# Patient Record
Sex: MALE | Race: WHITE | Employment: FULL TIME | URBAN - METROPOLITAN AREA
[De-identification: names, ages, dates, MRNs, and addresses within clinical notes are randomized per-mention and may not be internally consistent; named-entity substitution may affect disease eponyms.]

---

## 2019-06-02 ENCOUNTER — OFFICE VISIT (OUTPATIENT)
Dept: URGENT CARE | Facility: RETAIL CLINIC | Age: 39
End: 2019-06-02

## 2019-06-02 VITALS
SYSTOLIC BLOOD PRESSURE: 157 MMHG | BODY MASS INDEX: 44.11 KG/M2 | OXYGEN SATURATION: 97 % | HEART RATE: 87 BPM | DIASTOLIC BLOOD PRESSURE: 101 MMHG | WEIGHT: 288 LBS | TEMPERATURE: 98.3 F

## 2019-06-02 DIAGNOSIS — E66.01 MORBID OBESITY (H): ICD-10-CM

## 2019-06-02 DIAGNOSIS — R09.81 NASAL CONGESTION: ICD-10-CM

## 2019-06-02 DIAGNOSIS — R05.9 COUGH: Primary | ICD-10-CM

## 2019-06-02 DIAGNOSIS — J06.9 UPPER RESPIRATORY TRACT INFECTION, UNSPECIFIED TYPE: ICD-10-CM

## 2019-06-02 PROCEDURE — 99213 OFFICE O/P EST LOW 20 MIN: CPT | Performed by: NURSE PRACTITIONER

## 2019-06-02 RX ORDER — BENZONATATE 200 MG/1
200 CAPSULE ORAL 3 TIMES DAILY PRN
Qty: 30 CAPSULE | Refills: 0 | Status: SHIPPED | OUTPATIENT
Start: 2019-06-02

## 2019-06-02 ASSESSMENT — PAIN SCALES - GENERAL: PAINLEVEL: NO PAIN (0)

## 2019-06-02 NOTE — PROGRESS NOTES
SUBJECTIVE:  Trace Pereira is a 38 year old male who presents to the clinic today with a chief complaint of cough , shortness of breath. and wheezing. for 4 day(s).  His cough is described as persistent, productive of yellow sputum, spasmodic, wheezing and cough.    The patient's symptoms are moderate and not changing over the course of time.  Associated symptoms include congestion, nasal congestion, rhinorrhea, malaise, shortness of breath, sore throat, wheezing, headache. The patient's symptoms are exacerbated by cold air and smoke  Patient has been using ibuprofen and EmergenC to improve symptoms.    Past Medical History:   Diagnosis Date     Nasal bones, closed fracture 7/13/2000     No current outpatient medications on file.     History   Smoking Status     Never Smoker   Smokeless Tobacco     Never Used       ROS  Review of systems negative except as stated above.    OBJECTIVE:  BP (!) 157/101   Pulse 87   Temp 98.3  F (36.8  C) (Temporal)   Wt 130.6 kg (288 lb)   SpO2 97%   BMI 44.11 kg/m    GENERAL APPEARANCE: alert, mild distress, moderate distress, cooperative and over weight  EYES: EOMI,  PERRL, conjunctiva clear  HENT: ear canals and TM's normal.  Nose and mouth without ulcers, erythema or lesions  NECK: supple, nontender, no lymphadenopathy  RESP: lungs clear to auscultation - no rales, rhonchi or wheezes  CV: regular rates and rhythm, normal S1 S2, no murmur noted  ABDOMEN:  soft, nontender, no HSM or masses and bowel sounds normal  NEURO: Normal strength and tone, sensory exam grossly normal,  normal speech and mentation  SKIN: no suspicious lesions or rashes    ASSESSMENT:    Encounter Diagnoses   Name Primary?     Cough Yes     Morbid obesity (H)      Nasal congestion      Upper respiratory tract infection, unspecified type          PLAN:  Current Outpatient Medications   Medication     benzonatate (TESSALON) 200 MG capsule     No current facility-administered medications for this visit.         Blood Pressure reviewed & discussed, Trace Pereira advised to F/U with PCP.    Get plenty of rest & drink plenty of fluids (mainly water).  Take OTC, or medications prescribed to treat symptoms.  Mucinex is product known to help loosen congestion (generics are available.).   Dark Honey, such as Vidal Wheat Honey has been shown to be helpful in cough management.  Avoid smoke (cigarettes or fireplace/wood burning stoves).  If you develop trouble breathing, swallowing or cough-up blood, immediately go to ER.  Using a vaporizer, humidifier, or steam from hot water to add moisture to the air can help  Follow-up with primary care provider if not improving with in 3 days or symptoms worsen.  A cough may last up to 2 weeks.    Randall Jenkins MSN, APRN, Family NP-C  [facility]  June 2, 2019